# Patient Record
Sex: MALE | Race: WHITE | NOT HISPANIC OR LATINO | Employment: FULL TIME | URBAN - METROPOLITAN AREA
[De-identification: names, ages, dates, MRNs, and addresses within clinical notes are randomized per-mention and may not be internally consistent; named-entity substitution may affect disease eponyms.]

---

## 2017-04-07 ENCOUNTER — RECORDS - HEALTHEAST (OUTPATIENT)
Dept: GENERAL RADIOLOGY | Facility: CLINIC | Age: 42
End: 2017-04-07

## 2017-04-07 ENCOUNTER — OFFICE VISIT - HEALTHEAST (OUTPATIENT)
Dept: FAMILY MEDICINE | Facility: CLINIC | Age: 42
End: 2017-04-07

## 2017-04-07 DIAGNOSIS — M25.531 RIGHT WRIST PAIN: ICD-10-CM

## 2017-04-07 DIAGNOSIS — M25.531 PAIN IN RIGHT WRIST: ICD-10-CM

## 2017-04-07 DIAGNOSIS — S63.509A SPRAIN OF WRIST: ICD-10-CM

## 2017-11-16 ENCOUNTER — COMMUNICATION - HEALTHEAST (OUTPATIENT)
Dept: SCHEDULING | Facility: CLINIC | Age: 42
End: 2017-11-16

## 2017-11-17 ENCOUNTER — OFFICE VISIT - HEALTHEAST (OUTPATIENT)
Dept: FAMILY MEDICINE | Facility: CLINIC | Age: 42
End: 2017-11-17

## 2017-11-17 DIAGNOSIS — R05.9 COUGH: ICD-10-CM

## 2017-11-17 ASSESSMENT — MIFFLIN-ST. JEOR: SCORE: 1910.05

## 2019-10-02 ENCOUNTER — OFFICE VISIT - HEALTHEAST (OUTPATIENT)
Dept: FAMILY MEDICINE | Facility: CLINIC | Age: 44
End: 2019-10-02

## 2019-10-02 DIAGNOSIS — Z87.74 HISTORY OF TETRALOGY OF FALLOT REPAIR: ICD-10-CM

## 2019-10-02 DIAGNOSIS — Z76.89 ESTABLISHING CARE WITH NEW DOCTOR, ENCOUNTER FOR: ICD-10-CM

## 2019-10-02 DIAGNOSIS — Z12.5 SPECIAL SCREENING FOR MALIGNANT NEOPLASM OF PROSTATE: ICD-10-CM

## 2019-10-02 LAB — PSA SERPL-MCNC: 0.4 NG/ML (ref 0–2.5)

## 2019-10-02 RX ORDER — MULTIPLE VITAMINS W/ MINERALS TAB 9MG-400MCG
1 TAB ORAL DAILY
Status: SHIPPED | COMMUNITY
Start: 2019-10-02

## 2021-05-30 VITALS — BODY MASS INDEX: 27.7 KG/M2 | WEIGHT: 215.75 LBS

## 2021-05-31 VITALS — BODY MASS INDEX: 26.75 KG/M2 | HEIGHT: 74 IN | WEIGHT: 208.4 LBS

## 2021-06-01 NOTE — PROGRESS NOTES
OFFICE VISIT - FAMILY MEDICINE     ASSESSMENT AND PLAN     1. Establishing care with new doctor, encounter for     2. History of tetralogy of Fallot repair     3. Special screening for malignant neoplasm of prostate  PSA, Annual Screen (Prostatic-Specific Antigen)   New patient, establishing care with a new provider, history of tetralogy of follow-up, followed by [facility] on a yearly basis.  Family history of prostate cancer, will get a baseline PSA.  We will have the patient return for general physical exam at his earliest convenience.He  Declined flu vaccine.    CHIEF COMPLAINT   Establish Care; Labs Only (would like to do PSA, due to paternal grandfather history of prostate cancer.); and Consult (would like to meet provider and discuss problem solving technique.)    HPI   Matias Fernandez is a 44 y.o. male.  No Patient Care Coordination Note on file.  New patient, here to establish care and to get a PSA screening, on further questioning mention a history of surgery of the urinary for tetralogy of followed, currently being managed by the [facility], he does follow with them once a year.  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  Denies any chest pain, shortness of breath or dizziness.  Paternal grandfather has a history of prostate cancer, patient is concerned about that, denies any blood in the urine, no difficulty with urination.  Would like to get a baseline PSA.  Healthcare maintenance, has not had a physical with cholesterol check in the past we have discussed about the importance of cardiovascular risk assessment.  Medical record does mention hypertension,but he is stating that has never been treated for that.    Review of Systems As per HPI, otherwise negative.    OBJECTIVE   /68 (Patient Site: Right Arm, Patient Position: Sitting, Cuff Size: Adult Regular)   Pulse (!)  56   Wt (!) 226 lb 8 oz (102.7 kg) Comment: shoes on  SpO2 98%   BMI 29.08 kg/m    Physical Exam   Constitutional: He is oriented to person, place, and time. He appears well-developed and well-nourished.   HENT:   Head: Normocephalic and atraumatic.   Neck: Normal range of motion. Neck supple. No JVD present. No tracheal deviation present. No thyromegaly present.   Cardiovascular: Normal rate, regular rhythm, normal heart sounds and intact distal pulses. Exam reveals no gallop and no friction rub.   No murmur heard.  Pulmonary/Chest: Effort normal and breath sounds normal. No respiratory distress. He has no wheezes. He has no rales. Musculoskeletal:         General: No tenderness or edema.     Lymphadenopathy:     He has no cervical adenopathy.   Neurological: He is alert and oriented to person, place, and time. Coordination normal.   Psychiatric: He has a normal mood and affect. Judgment and thought content normal.       PFSH   No family history on file.  Social History     Socioeconomic History     Marital status:      Spouse name: Not on file     Number of children: Not on file     Years of education: Not on file     Highest education level: Not on file   Occupational History     Not on file   Social Needs     Financial resource strain: Not on file     Food insecurity:     Worry: Not on file     Inability: Not on file     Transportation needs:     Medical: Not on file     Non-medical: Not on file   Tobacco Use     Smoking status: Never Smoker     Smokeless tobacco: Never Used   Substance and Sexual Activity     Alcohol use: Yes     Drug use: No     Sexual activity: Yes   Lifestyle     Physical activity:     Days per week: Not on file     Minutes per session: Not on file     Stress: Not on file   Relationships     Social connections:     Talks on phone: Not on file     Gets together: Not on file     Attends [employer] service: Not on file     Active member of club or organization: Not on file     Attends  meetings of clubs or organizations: Not on file     Relationship status: Not on file     Intimate partner violence:     Fear of current or ex partner: Not on file     Emotionally abused: Not on file     Physically abused: Not on file     Forced sexual activity: Not on file   Other Topics Concern     Not on file   Social History Narrative     Not on file     Relevant history was reviewed with the patient today, unless noted in HPI, nothing is pertinent for this visit.  [facility]     Patient Active Problem List    Diagnosis Date Noted     Food intolerance 08/23/2015     History of tetralogy of Fallot repair 08/23/2015     Joint Pain, Localized In The Hip      Overview Note:     Created by Conversion         Tetralogy of Fallot 07/18/2012     No past surgical history on file.    RESULTS/CONSULTS (Lab/Rad)   No results found for this or any previous visit (from the past 168 hour(s)).  No results found.  MEDICATIONS     Current Outpatient Medications on File Prior to Visit   Medication Sig Dispense Refill     ascorbic acid (VITAMIN C ORAL) Take by mouth.       aspirin 81 MG EC tablet Take 81 mg by mouth daily.       ergocalciferol, vitamin D2, (VITAMIN D2 ORAL) Take by mouth.       multivitamin with minerals (THERA-M) 9 mg iron-400 mcg Tab tablet Take 1 tablet by mouth daily.       UNABLE TO FIND daily. Med Name:Brazilnut (actual nut)       VITAMIN K2 ORAL Take by mouth.       No current facility-administered medications on file prior to visit.        HEALTH MAINTENANCE / SCREENING   PHQ-2 Total Score: 0 (10/2/2019  2:03 PM)  , No data recorded,No data recorded  Immunization History   Administered Date(s) Administered     DTP 1977, 1977, 08/06/1979, 08/20/1982     Hep B, Peds or Adolescent 12/23/1993, 01/21/1994, 07/13/1994     Influenza,seasonal, Inj IIV3 11/21/2001, 11/26/2002     MMR 01/11/1979, 07/02/1990     OPV,Trivalent,Historic(6455-6374 only) 1977, 1977, 1977, 08/06/1979, 08/20/1982      Td, Adult, Absorbed 08/05/1992     Health Maintenance   Topic     PREVENTIVE CARE VISIT      HIV SCREENING      TDAP ADULT ONE TIME DOSE      TD 18+ HE      INFLUENZA VACCINE RULE BASED (1)     ADVANCE CARE PLANNING        Kelsey Lake MD  Family Medicine, [facility]     This note was dictated using a voice recognition software.  Any grammatical or context distortion are unintentional and inherent to the software.

## 2021-06-03 VITALS
OXYGEN SATURATION: 98 % | SYSTOLIC BLOOD PRESSURE: 101 MMHG | WEIGHT: 226.5 LBS | HEART RATE: 56 BPM | BODY MASS INDEX: 29.08 KG/M2 | DIASTOLIC BLOOD PRESSURE: 68 MMHG

## 2021-06-09 NOTE — PROGRESS NOTES
Patient is a 41-year-old male who presents today with right wrist pain.    She notes that he was at the gym yesterday over the lunch hour and he punched a body bag without gloves.  At the time he felt a pop and then felt pain afterwards.  He noticed a little bit of swelling at the wrist.  He notes that he has some wrist pain with picking up things where he has to twist.  He notes that typing is painful.  He denies any bruising.  He notes that if he lifts something straight up and down, that there is no pain but the slightest twist will cause significant pain.  He notes no numbness or tingling.    Objective     /68 (Patient Site: Left Arm, Patient Position: Sitting)  Pulse (!) 49  Wt 215 lb 12 oz (97.9 kg)  SpO2 99%  BMI 27.7 kg/m2  General appearance: alert, appears stated age and cooperative  Extremities: right hand/wrist   Inspection: mild swelling along the dorsum of the wrist on the ulnar side.  Palpation: Tenderness along the dorsal retinaculum.  No bony tenderness, no snuffbox tenderness.  No tenderness to palpation of the MCs.  ROM: Normal flexion, extension (with some discomfort), AB, ADduction of the wrist.  Normal flexion and extension of the fingers.  Pulses: 2+ bilaterally.    Xray wrist:  INDICATION: Right wrist pain  COMPARISON: None.     FINDINGS: Negative wrist. No fracture or dislocation.     This report was electronically interpreted by: Dr. Salty Mccoy MD ON 04/07/2017 at 11:48    Matias was seen today for wrist injury.    Diagnoses and all orders for this visit:    Sprain of wrist  -     Wrist Brace w/o Tom Das  Will provide a wrist brace for support.  Discussed RICE cares and gentle ROM exercises.  Patient to notify if symptoms worsen or do not improve.    Right wrist pain  -     XR Wrist Right 3 or More VWS; Future

## 2021-06-14 NOTE — PROGRESS NOTES
"  Assessment/Plan:      Cough  Likely secondary to a post viral cough.  Patient is not wheezing on examination today, he defers a short course of steroids.  He also defers an albuterol inhaler.  Discussed over-the-counter cough and cold medicines.  He will continue to get plenty of rest, drink plenty of fluids.  He will follow-up if not improving as expected.  - XR Chest PA and Lateral        Subjective:       Matias Fernandez is a 42 y.o. male who presents for evaluation of cough and perceived wheeze.  His symptoms started 5 days ago, he is actually starting to improve somewhat.  He continues to cough and feels a bit \"wheezy\".  He has had bronchitis in the past per his report, has never been a smoker, has no previous history of asthma.  He has never checked himself for a fever, did feel a bit warm 3 days ago.  He has had some body aches.  He does not feel particularly short of breath.  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    The following portions of the patient's history were reviewed and updated as appropriate: allergies, current medications, past medical history, past surgical history and problem list.      Current Outpatient Prescriptions:      aspirin 81 MG EC tablet, Take 81 mg by mouth daily., Disp: , Rfl:     Review of Systems   Pertinent items are noted in HPI.      Objective:      /62 (Patient Site: Right Arm, Patient Position: Sitting, Cuff Size: Adult Regular)  Pulse (!) 51  Temp 98.4  F (36.9  C) (Oral)   Resp 20  Ht 6' 2\" (1.88 m)  Wt 208 lb 6.4 oz (94.5 kg)  SpO2 98%  BMI 26.76 kg/m2    General appearance: alert, appears stated age and cooperative  Head: Normocephalic, without obvious abnormality, atraumatic  Eyes: conjunctivae clear  Ears: normal TM's and external ear canals both ears  Nose: Nares normal. Septum midline. Mucosa normal. No drainage or sinus tenderness.  Throat: oral mucosa moist, posterior pharynx clear without exudate  Neck: no " adenopathy  Lungs: clear to auscultation bilaterally  Heart: regular rate and rhythm, grade IV-V/VI systolic murmur heard throughout        CXR: cardiomegaly, clear lungs (my read)

## 2021-06-27 ENCOUNTER — HEALTH MAINTENANCE LETTER (OUTPATIENT)
Age: 46
End: 2021-06-27

## 2021-10-17 ENCOUNTER — HEALTH MAINTENANCE LETTER (OUTPATIENT)
Age: 46
End: 2021-10-17

## 2021-12-07 ENCOUNTER — VIRTUAL VISIT (OUTPATIENT)
Dept: FAMILY MEDICINE | Facility: CLINIC | Age: 46
End: 2021-12-07
Payer: COMMERCIAL

## 2021-12-07 DIAGNOSIS — J20.9 ACUTE BRONCHITIS, UNSPECIFIED ORGANISM: Primary | ICD-10-CM

## 2021-12-07 PROCEDURE — 99213 OFFICE O/P EST LOW 20 MIN: CPT | Mod: 95 | Performed by: FAMILY MEDICINE

## 2021-12-07 RX ORDER — AZITHROMYCIN 250 MG/1
TABLET, FILM COATED ORAL
Qty: 6 TABLET | Refills: 0 | Status: SHIPPED | OUTPATIENT
Start: 2021-12-07 | End: 2021-12-12

## 2021-12-07 NOTE — PROGRESS NOTES
Matias is a 46 year old who is being evaluated via a billable video visit.      How would you like to obtain your AVS? MyChart  If the video visit is dropped, the invitation should be resent by: Text to cell phone: [phone]  Will anyone else be joining your video visit? No      Video Start Time: 12:02 PM    Assessment & Plan     Acute bronchitis, unspecified organism  History consistent with acute bronchitis now with a little bit of shortness of breath and wheezing.  He remains afebrile no concerns for pneumonia.  Given the wheezing I do want some anti-inflammatory properties on board.  I do not think that he needs prednisone yet however.  Discussed treatment with azithromycin to treat infection plus anti-inflammatory properties could help the wheezing as well.  We will start with this and if he is not better in 1 week or wheezing continues he can reach out and I will send in prescription for prednisone.  He was agreeable to plan.  He will also  some probiotics to take along with a Z-Cecilio.  Reasons to call return [employer] discussed in detail.  - azithromycin (ZITHROMAX) 250 MG tablet  Dispense: 6 tablet; Refill: 0    No follow-ups on file.    Brielle Pierre DO  [facility]    Subjective   Matias is a 46 year old who presents for the following health issues .  He has medical history significant for congenital heart disease with tetralogy of Fallot and repair.  Follows with cardiology.    HPI   Has hx of lingering cough after being sick  mucous in throat  Has had antibx in the past  Has had in the past to clear it up real quick  At first no wheeizng  But will cough with some phelgm reaction  Morning are the worst  Expect wheezing to come back in the morning  50:50  Wheezing in Upper chest lungs  Cold a couple weeks back.   almost a week for the cold now over a week persistent cough  A little bit more green  Vaccinated for COVID.   Didn't test for covid  Didn't loose taste or smell  No  fevers  A little SOB  Wheezing now.   Wanted to be seen before the end of the year as his insurance is changing.  Sometimes this cough will just keep persisting and he eventually needs to be seen.    Review of Systems   Constitutional, HEENT, cardiovascular, pulmonary, gi and gu systems are negative, except as otherwise noted.      Objective           Vitals:  No vitals were obtained today due to virtual visit.    Physical Exam   GENERAL: Healthy, alert and no distress  EYES: Eyes grossly normal to inspection.  No discharge or erythema, or obvious scleral/conjunctival abnormalities.  RESP: No audible wheeze, cough, or visible cyanosis.  No visible retractions or increased work of breathing.    SKIN: Visible skin clear. No significant rash, abnormal pigmentation or lesions.  NEURO: Cranial nerves grossly intact.  Mentation and speech appropriate for age.  PSYCH: Mentation appears normal, affect normal/bright, judgement and insight intact, normal speech and appearance well-groomed.                Video-Visit Details    Type of service:  Video Visit    Video End Time:12:11 PM    Originating Location (pt. Location): Home    Distant Location (provider location):  [facility]     Platform used for Video Visit: [employer]

## 2022-07-24 ENCOUNTER — HEALTH MAINTENANCE LETTER (OUTPATIENT)
Age: 47
End: 2022-07-24

## 2022-10-02 ENCOUNTER — HEALTH MAINTENANCE LETTER (OUTPATIENT)
Age: 47
End: 2022-10-02

## 2024-07-27 ENCOUNTER — HEALTH MAINTENANCE LETTER (OUTPATIENT)
Age: 49
End: 2024-07-27